# Patient Record
Sex: MALE | Race: WHITE | URBAN - METROPOLITAN AREA
[De-identification: names, ages, dates, MRNs, and addresses within clinical notes are randomized per-mention and may not be internally consistent; named-entity substitution may affect disease eponyms.]

---

## 2018-04-12 ENCOUNTER — APPOINTMENT (RX ONLY)
Dept: URBAN - METROPOLITAN AREA CLINIC 97 | Facility: CLINIC | Age: 63
Setting detail: DERMATOLOGY
End: 2018-04-12

## 2018-04-12 DIAGNOSIS — L30.9 DERMATITIS, UNSPECIFIED: ICD-10-CM

## 2018-04-12 PROBLEM — E13.9 OTHER SPECIFIED DIABETES MELLITUS WITHOUT COMPLICATIONS: Status: ACTIVE | Noted: 2018-04-12

## 2018-04-12 PROCEDURE — ? COUNSELING

## 2018-04-12 PROCEDURE — 99213 OFFICE O/P EST LOW 20 MIN: CPT

## 2018-04-12 PROCEDURE — ? PRESCRIPTION

## 2018-04-12 RX ORDER — CIPROFLOXACIN HYDROCHLORIDE 500 MG/1
TABLET, FILM COATED ORAL
Qty: 14 | Refills: 0 | Status: ERX

## 2018-04-12 RX ORDER — PREDNISONE 10 MG/1
TABLET ORAL
Qty: 13 | Refills: 0 | Status: ERX

## 2018-04-12 RX ORDER — BETAMETHASONE DIPROPIONATE 0.5 MG/G
OINTMENT TOPICAL
Qty: 1 | Refills: 2 | Status: ERX | COMMUNITY
Start: 2018-04-12

## 2018-04-12 RX ADMIN — BETAMETHASONE DIPROPIONATE APPLY SMALL AMOUNT: 0.5 OINTMENT TOPICAL at 00:00

## 2019-04-05 ENCOUNTER — APPOINTMENT (RX ONLY)
Dept: URBAN - METROPOLITAN AREA CLINIC 97 | Facility: CLINIC | Age: 64
Setting detail: DERMATOLOGY
End: 2019-04-05

## 2019-04-05 DIAGNOSIS — L72.8 OTHER FOLLICULAR CYSTS OF THE SKIN AND SUBCUTANEOUS TISSUE: ICD-10-CM

## 2019-04-05 DIAGNOSIS — L30.9 DERMATITIS, UNSPECIFIED: ICD-10-CM

## 2019-04-05 PROCEDURE — 11900 INJECT SKIN LESIONS </W 7: CPT

## 2019-04-05 PROCEDURE — ? INTRALESIONAL KENALOG

## 2019-04-05 PROCEDURE — ? COUNSELING

## 2019-04-05 PROCEDURE — ? PRESCRIPTION

## 2019-04-05 PROCEDURE — 99212 OFFICE O/P EST SF 10 MIN: CPT | Mod: 25

## 2019-04-05 RX ORDER — CIPROFLOXACIN HYDROCHLORIDE 500 MG/1
TABLET, FILM COATED ORAL
Qty: 20 | Refills: 1 | Status: ERX | COMMUNITY
Start: 2019-04-05

## 2019-04-05 RX ADMIN — CIPROFLOXACIN HYDROCHLORIDE 1: 500 TABLET, FILM COATED ORAL at 00:00

## 2019-04-05 ASSESSMENT — LOCATION ZONE DERM
LOCATION ZONE: FACE
LOCATION ZONE: FACE

## 2019-04-05 ASSESSMENT — LOCATION SIMPLE DESCRIPTION DERM
LOCATION SIMPLE: LEFT CHEEK
LOCATION SIMPLE: LEFT CHEEK

## 2019-04-05 ASSESSMENT — LOCATION DETAILED DESCRIPTION DERM
LOCATION DETAILED: LEFT MEDIAL MALAR CHEEK
LOCATION DETAILED: LEFT MEDIAL MALAR CHEEK

## 2019-04-05 NOTE — PROCEDURE: INTRALESIONAL KENALOG
Include Z78.9 (Other Specified Conditions Influencing Health Status) As An Associated Diagnosis?: No
Kenalog Preparation: Kenalog
Total Volume Injected (Ccs- Only Use Numbers And Decimals): 0.2
X Size Of Lesion In Cm (Optional): 0
Concentration Of Solution Injected (Mg/Ml): 5.0
Detail Level: Zone
Medical Necessity Clause: This procedure was medically necessary because the lesions that were treated were:
Consent: The risks of atrophy were reviewed with the patient.
Ndc# For Kenalog Only: 3550-1060-90
Administered By (Optional): Dr. Loretta Goldberg

## 2019-04-05 NOTE — PROCEDURE: MIPS QUALITY
Detail Level: Zone
Quality 474: Zoster Vaccination Status: Shingrix Vaccination not Administered or Previously Received, Reason not Otherwise Specified
Additional Notes: 0/10
Quality 131: Pain Assessment And Follow-Up: Pain assessment using a standardized tool is documented as negative, no follow-up plan required
Quality 130: Documentation Of Current Medications In The Medical Record: Current Medications Documented

## 2019-04-12 ENCOUNTER — APPOINTMENT (RX ONLY)
Dept: URBAN - METROPOLITAN AREA CLINIC 97 | Facility: CLINIC | Age: 64
Setting detail: DERMATOLOGY
End: 2019-04-12

## 2019-04-12 DIAGNOSIS — L85.3 XEROSIS CUTIS: ICD-10-CM

## 2019-04-12 DIAGNOSIS — L72.8 OTHER FOLLICULAR CYSTS OF THE SKIN AND SUBCUTANEOUS TISSUE: ICD-10-CM

## 2019-04-12 PROCEDURE — 99213 OFFICE O/P EST LOW 20 MIN: CPT | Mod: 25

## 2019-04-12 PROCEDURE — 11900 INJECT SKIN LESIONS </W 7: CPT

## 2019-04-12 PROCEDURE — ? INTRALESIONAL KENALOG

## 2019-04-12 PROCEDURE — ? TREATMENT REGIMEN

## 2019-04-12 ASSESSMENT — LOCATION ZONE DERM: LOCATION ZONE: FACE

## 2019-04-12 ASSESSMENT — LOCATION DETAILED DESCRIPTION DERM: LOCATION DETAILED: LEFT CENTRAL MALAR CHEEK

## 2019-04-12 ASSESSMENT — LOCATION SIMPLE DESCRIPTION DERM: LOCATION SIMPLE: LEFT CHEEK

## 2019-04-12 NOTE — PROCEDURE: MIPS QUALITY
Quality 131: Pain Assessment And Follow-Up: Pain assessment using a standardized tool is documented as negative, no follow-up plan required
Quality 130: Documentation Of Current Medications In The Medical Record: Current Medications Documented
Additional Notes: Pain-0
Detail Level: Zone

## 2019-04-12 NOTE — PROCEDURE: TREATMENT REGIMEN
Action 1: Continue
Detail Level: Zone
Plan: If Cyst Still Remains Next Week Patient Should See Plastic Surgeon For Removal.
Otc Regimen: Continue Warm Soaks BID
Continue Regimen: Finish Cipro 500mg-BID

## 2019-04-12 NOTE — PROCEDURE: INTRALESIONAL KENALOG
Total Volume Injected (Ccs- Only Use Numbers And Decimals): 0.2
Ndc# For Kenalog Only: [id number]
Administered By (Optional): Dr. Fco Trevino
Medical Necessity Clause: This procedure was medically necessary because the lesions that were treated were:
Include Z78.9 (Other Specified Conditions Influencing Health Status) As An Associated Diagnosis?: No
Concentration Of Solution Injected (Mg/Ml): 5.0
X Size Of Lesion In Cm (Optional): 0
Detail Level: Zone
Consent: The risks of atrophy were reviewed with the patient.
Kenalog Preparation: Kenalog
Expiration Date (Optional): 2/2020